# Patient Record
Sex: MALE | Race: WHITE | NOT HISPANIC OR LATINO | Employment: UNEMPLOYED | URBAN - METROPOLITAN AREA
[De-identification: names, ages, dates, MRNs, and addresses within clinical notes are randomized per-mention and may not be internally consistent; named-entity substitution may affect disease eponyms.]

---

## 2024-11-06 ENCOUNTER — HOSPITAL ENCOUNTER (EMERGENCY)
Facility: HOSPITAL | Age: 84
Discharge: HOME | End: 2024-11-07
Attending: INTERNAL MEDICINE
Payer: MEDICARE

## 2024-11-06 DIAGNOSIS — F03.A0 MILD DEMENTIA, UNSPECIFIED DEMENTIA TYPE, UNSPECIFIED WHETHER BEHAVIORAL, PSYCHOTIC, OR MOOD DISTURBANCE OR ANXIETY: Primary | ICD-10-CM

## 2024-11-06 PROCEDURE — 99284 EMERGENCY DEPT VISIT MOD MDM: CPT

## 2024-11-07 VITALS
HEART RATE: 68 BPM | OXYGEN SATURATION: 100 % | RESPIRATION RATE: 16 BRPM | HEIGHT: 67 IN | BODY MASS INDEX: 25.9 KG/M2 | WEIGHT: 165 LBS | DIASTOLIC BLOOD PRESSURE: 88 MMHG | TEMPERATURE: 99 F | SYSTOLIC BLOOD PRESSURE: 137 MMHG

## 2024-11-07 LAB — POCT GLUCOSE: 103 MG/DL (ref 70–110)

## 2024-11-07 PROCEDURE — 82962 GLUCOSE BLOOD TEST: CPT

## 2024-11-07 NOTE — ED PROVIDER NOTES
Encounter Date: 11/6/2024       History     Chief Complaint   Patient presents with    Dementia     Pt. Is a missing person in [place]. Pulled over by PD here, waiting for family. No complaints     Presents by EMS after police intervention. Apparently pt drove from [place], got lost. Police located him in [place], contacted family and was brought to ED for evaluation by EMS. Pt claims he is from [place], states have never been in [place], admits he was driving and he lives by himself. States his family is also in [place] and are sleeping at this time. Pt states not been in [place] either. States uses Xanax to help him sleep, last dose was last night.     The history is provided by the patient and the EMS personnel.     Review of patient's allergies indicates:  Not on File  History reviewed. No pertinent past medical history.  History reviewed. No pertinent surgical history.  No family history on file.  Social History     Tobacco Use    Smoking status: Unknown   Substance Use Topics    Alcohol use: Not Currently    Drug use: Not Currently     Review of Systems   Unable to perform ROS: Dementia   All other systems reviewed and are negative.      Physical Exam     Initial Vitals [11/06/24 2343]   BP Pulse Resp Temp SpO2   (!) 174/90 84 18 98.6 °F (37 °C) 98 %      MAP       --         Physical Exam    Nursing note and vitals reviewed.  Constitutional: He appears well-developed and well-nourished. No distress.   HENT:   Head: Normocephalic and atraumatic.   Eyes: Conjunctivae and EOM are normal. Pupils are equal, round, and reactive to light.   Neck: Neck supple. No thyromegaly present. No JVD present.   Normal range of motion.  Cardiovascular:  Normal rate, regular rhythm, normal heart sounds and intact distal pulses.           Pulmonary/Chest: Breath sounds normal. No respiratory distress.   Abdominal: Abdomen is soft. Bowel sounds are normal. He exhibits no distension. There is no abdominal tenderness. There is no  rebound and no guarding.   Musculoskeletal:         General: No edema. Normal range of motion.      Cervical back: Normal range of motion and neck supple.     Neurological: He is alert. He has normal strength.   Oriented in person, Not time or place   Skin: Skin is warm and dry. No rash noted.   Psychiatric: His behavior is normal. Thought content normal.         ED Course   Procedures  Labs Reviewed   POCT GLUCOSE       Result Value    POCT Glucose 103            Imaging Results    None          Medications - No data to display  Medical Decision Making  Amount and/or Complexity of Data Reviewed  Independent Historian: EMS     Details: Apparently pt drove from [place], got lost. Police located him in [place], contacted family and was brought to ED for evaluation by EMS  Labs: ordered. Decision-making details documented in ED Course.               ED Course as of 11/08/24 0741   Thu Nov 07, 2024   1751 Spoke to the patient's daughter, advised safety measures to be put in place for the patient. She is contacting PCP tomorrow and  to discuss POA and options for his safety.  [RQ]      ED Course User Index  [RQ] Lauren Hawkins FNP             6:57 AM    Pts daughter still in [place], unsure when will be available to travel to [place] to  her father. Daughter states she is also carrying for her mother with dementia, needs to coordinate care and transportation.     Will consult  in AM for assistance. Pt stable.              Clinical Impression:  Final diagnoses:  [F03.A0] Mild dementia, unspecified dementia type, unspecified whether behavioral, psychotic, or mood disturbance or anxiety (Primary)          ED Disposition Condition    Discharge Stable          ED Prescriptions    None       Follow-up Information       Follow up With Specialties Details Why Contact Info    [facility] Emergency Medicine  If symptoms worsen [address]  [address]  [phone]    PCP  In 3 days  Follow up with PCP in 3-5 days.             Soren Bower MD  11/08/24 0753

## 2024-11-07 NOTE — ED NOTES
SPOKE W/ PT DAUGHTER KAI, WHO REPORTS SHE HAS NOT FOUND A WAY TO GET HERE TO PICK PT UP YET. REPORTS SHE WILL TRY TO FIND SOMEONE AT ~0700 AND THEN EITHER DRIVE OR FLY HERE TO RETRIEVE THE PT AND HIS VEHICLE.